# Patient Record
Sex: MALE | Race: WHITE | NOT HISPANIC OR LATINO | Employment: OTHER | URBAN - METROPOLITAN AREA
[De-identification: names, ages, dates, MRNs, and addresses within clinical notes are randomized per-mention and may not be internally consistent; named-entity substitution may affect disease eponyms.]

---

## 2021-07-29 ENCOUNTER — NEW PATIENT COMPREHENSIVE (OUTPATIENT)
Dept: URBAN - METROPOLITAN AREA CLINIC 36 | Facility: CLINIC | Age: 66
End: 2021-07-29

## 2021-07-29 DIAGNOSIS — H25.812: ICD-10-CM

## 2021-07-29 DIAGNOSIS — H52.7: ICD-10-CM

## 2021-07-29 DIAGNOSIS — H25.811: ICD-10-CM

## 2021-07-29 DIAGNOSIS — H18.513: ICD-10-CM

## 2021-07-29 PROCEDURE — 92015 DETERMINE REFRACTIVE STATE: CPT

## 2021-07-29 PROCEDURE — 92004 COMPRE OPH EXAM NEW PT 1/>: CPT

## 2021-07-29 ASSESSMENT — VISUAL ACUITY
OS_SC: >J10
OS_CC: J1+
OS_SC: 20/100
OS_CC: 20/25
OD_SC: 20/40
OD_CC: J1
OD_CC: 20/40-2
OD_SC: >J10

## 2021-07-29 ASSESSMENT — TONOMETRY
OD_IOP_MMHG: 17
OS_IOP_MMHG: 17

## 2021-10-21 NOTE — PROCEDURE NOTE: SURGICAL
<p>Prior to commencing surgery patient identification, surgical procedure, site, and side were confirmed by Dr. Diya Alexandra. Following topical proparacaine anesthesia, the patient was positioned at the YAG laser, a contact lens coupled to the cornea with methylcellulose and an axial posterior capsulotomy performed without complication using 3.2 Mj x 52. Excess methylcellulose was washed from the eye, one drop of Alphagan was instilled and the patient returned to the holding area having tolerated the procedure well and without complication. </p><p>MRN: [id number]</p><p>&nbsp;</p>

## 2023-10-31 ENCOUNTER — COMPREHENSIVE EXAM (OUTPATIENT)
Dept: URBAN - METROPOLITAN AREA CLINIC 36 | Facility: CLINIC | Age: 68
End: 2023-10-31

## 2023-10-31 DIAGNOSIS — H25.813: ICD-10-CM

## 2023-10-31 DIAGNOSIS — H52.7: ICD-10-CM

## 2023-10-31 DIAGNOSIS — H18.513: ICD-10-CM

## 2023-10-31 DIAGNOSIS — H35.30: ICD-10-CM

## 2023-10-31 PROCEDURE — 92015 DETERMINE REFRACTIVE STATE: CPT

## 2023-10-31 PROCEDURE — 92134 CPTRZ OPH DX IMG PST SGM RTA: CPT

## 2023-10-31 PROCEDURE — 92014 COMPRE OPH EXAM EST PT 1/>: CPT

## 2023-10-31 ASSESSMENT — VISUAL ACUITY
OD_PH: 20/40
OD_CC: 20/400
OD_CC: J6
OS_CC: J3
OD_SC: 20/100
OS_CC: 20/30-1
OS_SC: 20/80-2

## 2023-10-31 ASSESSMENT — TONOMETRY
OD_IOP_MMHG: 14
OS_IOP_MMHG: 14

## 2023-11-07 ENCOUNTER — CONSULTATION/EVALUATION (OUTPATIENT)
Dept: URBAN - METROPOLITAN AREA CLINIC 36 | Facility: CLINIC | Age: 68
End: 2023-11-07

## 2023-11-07 DIAGNOSIS — H35.30: ICD-10-CM

## 2023-11-07 DIAGNOSIS — H25.813: ICD-10-CM

## 2023-11-07 DIAGNOSIS — H18.513: ICD-10-CM

## 2023-11-07 DIAGNOSIS — H52.7: ICD-10-CM

## 2023-11-07 PROCEDURE — V2799PMN IMPRIMIS PRED-MOXI-NEPAF 5ML

## 2023-11-07 PROCEDURE — 99214 OFFICE O/P EST MOD 30 MIN: CPT

## 2023-11-07 PROCEDURE — 92025-1 CORNEAL TOPOGRAPHY, INS

## 2023-11-07 PROCEDURE — 92134 CPTRZ OPH DX IMG PST SGM RTA: CPT

## 2023-11-07 PROCEDURE — 92136 OPHTHALMIC BIOMETRY: CPT

## 2023-11-07 ASSESSMENT — VISUAL ACUITY
OS_CC: J1
OS_SC: 20/80-1
OS_SC: <J12
OD_CC: J6
OD_SC: 20/100
OS_CC: 20/30
OD_SC: J12
OD_CC: 20/400

## 2023-11-07 ASSESSMENT — TONOMETRY
OS_IOP_MMHG: 16
OD_IOP_MMHG: 16

## 2023-11-10 ENCOUNTER — TECH ONLY (OUTPATIENT)
Dept: URBAN - METROPOLITAN AREA CLINIC 39 | Facility: CLINIC | Age: 68
End: 2023-11-10

## 2023-11-10 ENCOUNTER — PRE-OP/H&P (OUTPATIENT)
Dept: URBAN - METROPOLITAN AREA SURGERY 14 | Facility: SURGERY | Age: 68
End: 2023-11-10

## 2023-11-10 ENCOUNTER — SURGERY/PROCEDURE (OUTPATIENT)
Dept: URBAN - METROPOLITAN AREA CLINIC 36 | Facility: CLINIC | Age: 68
End: 2023-11-10

## 2023-11-10 DIAGNOSIS — H35.30: ICD-10-CM

## 2023-11-10 DIAGNOSIS — H25.811: ICD-10-CM

## 2023-11-10 DIAGNOSIS — H25.813: ICD-10-CM

## 2023-11-10 DIAGNOSIS — H18.513: ICD-10-CM

## 2023-11-10 DIAGNOSIS — Z96.1: ICD-10-CM

## 2023-11-10 DIAGNOSIS — H52.7: ICD-10-CM

## 2023-11-10 PROCEDURE — 66999LNSR LENSAR LASER FOR CAT SX

## 2023-11-10 PROCEDURE — 99211T TECH SERVICE

## 2023-11-10 PROCEDURE — 99211HP H&P OFFICE/OUTPATIENT VISIT, EST

## 2023-11-10 PROCEDURE — 66982AV COMPLEX CATARACT WITH ADVANCED IOL

## 2023-11-10 ASSESSMENT — VISUAL ACUITY
OD_PH: 20/50
OD_SC: <J12
OD_SC: 20/60

## 2023-11-10 ASSESSMENT — TONOMETRY: OD_IOP_MMHG: 23

## 2023-11-14 ENCOUNTER — POST OP/EVAL OF SECOND EYE (OUTPATIENT)
Dept: URBAN - METROPOLITAN AREA CLINIC 36 | Facility: CLINIC | Age: 68
End: 2023-11-14

## 2023-11-14 DIAGNOSIS — Z96.1: ICD-10-CM

## 2023-11-14 DIAGNOSIS — H35.30: ICD-10-CM

## 2023-11-14 DIAGNOSIS — H18.513: ICD-10-CM

## 2023-11-14 DIAGNOSIS — H25.812: ICD-10-CM

## 2023-11-14 PROCEDURE — 92012 INTRM OPH EXAM EST PATIENT: CPT

## 2023-11-14 PROCEDURE — 99024 POSTOP FOLLOW-UP VISIT: CPT

## 2023-11-14 ASSESSMENT — VISUAL ACUITY
OS_SC: >J12
OD_SC: 20/50
OD_SC: J3
OS_SC: 20/40+2

## 2023-11-14 ASSESSMENT — TONOMETRY
OD_IOP_MMHG: 14
OS_IOP_MMHG: 14

## 2023-11-17 ENCOUNTER — SURGERY/PROCEDURE (OUTPATIENT)
Dept: URBAN - METROPOLITAN AREA CLINIC 36 | Facility: CLINIC | Age: 68
End: 2023-11-17

## 2023-11-17 ENCOUNTER — PRE-OP/H&P (OUTPATIENT)
Dept: URBAN - METROPOLITAN AREA SURGERY 14 | Facility: SURGERY | Age: 68
End: 2023-11-17

## 2023-11-17 ENCOUNTER — TECH ONLY (OUTPATIENT)
Dept: URBAN - METROPOLITAN AREA CLINIC 39 | Facility: CLINIC | Age: 68
End: 2023-11-17

## 2023-11-17 DIAGNOSIS — Z96.1: ICD-10-CM

## 2023-11-17 DIAGNOSIS — H25.813: ICD-10-CM

## 2023-11-17 DIAGNOSIS — H52.7: ICD-10-CM

## 2023-11-17 DIAGNOSIS — H35.30: ICD-10-CM

## 2023-11-17 DIAGNOSIS — H18.513: ICD-10-CM

## 2023-11-17 PROCEDURE — 66999LNSR LENSAR LASER FOR CAT SX

## 2023-11-17 PROCEDURE — 66984AV REMOVE CATARACT, INSERT ADVANCED LENS

## 2023-11-17 PROCEDURE — 99211HP H&P OFFICE/OUTPATIENT VISIT, EST

## 2023-11-17 PROCEDURE — 99211T TECH SERVICE

## 2023-11-17 PROCEDURE — 99199PAV PROF ADVANCED VISION PACKAGE

## 2023-11-17 ASSESSMENT — VISUAL ACUITY
OS_PH: 20/40+2
OS_SC: 20/50

## 2023-11-17 ASSESSMENT — TONOMETRY: OS_IOP_MMHG: 10

## 2023-11-21 ENCOUNTER — POST-OP (OUTPATIENT)
Dept: URBAN - METROPOLITAN AREA CLINIC 47 | Facility: CLINIC | Age: 68
End: 2023-11-21

## 2023-11-21 DIAGNOSIS — Z96.1: ICD-10-CM

## 2023-11-21 ASSESSMENT — TONOMETRY
OS_IOP_MMHG: 15
OD_IOP_MMHG: 15

## 2023-11-21 ASSESSMENT — VISUAL ACUITY
OS_SC: 20/40
OU_SC: 20/30
OD_SC: 20/30-1

## 2023-12-13 ENCOUNTER — POST-OP (OUTPATIENT)
Dept: URBAN - METROPOLITAN AREA CLINIC 36 | Facility: CLINIC | Age: 68
End: 2023-12-13

## 2023-12-13 DIAGNOSIS — Z96.1: ICD-10-CM

## 2023-12-13 PROCEDURE — 99024 POSTOP FOLLOW-UP VISIT: CPT

## 2023-12-13 ASSESSMENT — TONOMETRY
OS_IOP_MMHG: 15
OD_IOP_MMHG: 16

## 2023-12-13 ASSESSMENT — VISUAL ACUITY
OU_SC: J1
OS_SC: 20/30-1
OD_SC: J1
OU_SC: 20/20
OD_SC: 20/20-1
OS_SC: J12

## 2024-06-13 ENCOUNTER — COMPREHENSIVE EXAM (OUTPATIENT)
Dept: URBAN - METROPOLITAN AREA CLINIC 36 | Facility: CLINIC | Age: 69
End: 2024-06-13

## 2024-06-13 DIAGNOSIS — H52.7: ICD-10-CM

## 2024-06-13 DIAGNOSIS — Z96.1: ICD-10-CM

## 2024-06-13 DIAGNOSIS — H35.30: ICD-10-CM

## 2024-06-13 DIAGNOSIS — H18.513: ICD-10-CM

## 2024-06-13 PROCEDURE — 92015 DETERMINE REFRACTIVE STATE: CPT

## 2024-06-13 PROCEDURE — 92014 COMPRE OPH EXAM EST PT 1/>: CPT

## 2024-06-13 ASSESSMENT — VISUAL ACUITY
OS_SC: 20/30-1
OD_SC: 20/25-1
OD_SC: J2
OS_SC: J6

## 2024-06-13 ASSESSMENT — TONOMETRY
OD_IOP_MMHG: 13
OS_IOP_MMHG: 13

## 2024-07-11 ENCOUNTER — CONSULTATION/EVALUATION (OUTPATIENT)
Dept: URBAN - METROPOLITAN AREA CLINIC 36 | Facility: CLINIC | Age: 69
End: 2024-07-11

## 2024-07-11 DIAGNOSIS — D49.2: ICD-10-CM

## 2024-07-11 PROCEDURE — 99213 OFFICE O/P EST LOW 20 MIN: CPT

## 2024-07-11 PROCEDURE — 92285 EXTERNAL OCULAR PHOTOGRAPHY: CPT

## 2024-07-11 ASSESSMENT — VISUAL ACUITY
OD_SC: 20/25-1
OS_SC: 20/30-1

## 2024-08-22 ENCOUNTER — FOLLOW UP (OUTPATIENT)
Dept: URBAN - METROPOLITAN AREA CLINIC 36 | Facility: CLINIC | Age: 69
End: 2024-08-22

## 2024-08-22 DIAGNOSIS — D49.2: ICD-10-CM

## 2024-08-22 PROCEDURE — 92285 EXTERNAL OCULAR PHOTOGRAPHY: CPT

## 2024-08-22 PROCEDURE — 67810 INCAL BX EYELID SKN LID MRGN: CPT | Mod: E4

## 2024-08-22 RX ORDER — ERYTHROMYCIN 5 MG/G
OINTMENT OPHTHALMIC
End: 2024-09-05

## 2024-08-22 ASSESSMENT — VISUAL ACUITY
OD_SC: 20/25-1
OS_SC: 20/30

## 2024-08-29 ENCOUNTER — FOLLOW UP (OUTPATIENT)
Dept: URBAN - METROPOLITAN AREA CLINIC 36 | Facility: CLINIC | Age: 69
End: 2024-08-29

## 2024-08-29 DIAGNOSIS — H43.392: ICD-10-CM

## 2024-08-29 DIAGNOSIS — H43.822: ICD-10-CM

## 2024-08-29 PROCEDURE — 92012 INTRM OPH EXAM EST PATIENT: CPT

## 2024-08-29 PROCEDURE — 92134 CPTRZ OPH DX IMG PST SGM RTA: CPT

## 2024-08-29 ASSESSMENT — TONOMETRY
OS_IOP_MMHG: 12
OD_IOP_MMHG: 13

## 2024-08-29 ASSESSMENT — VISUAL ACUITY
OS_SC: 20/30-2
OD_SC: 20/25-2

## 2024-09-05 ENCOUNTER — FOLLOW UP (OUTPATIENT)
Dept: URBAN - METROPOLITAN AREA CLINIC 36 | Facility: CLINIC | Age: 69
End: 2024-09-05

## 2024-09-05 DIAGNOSIS — H43.812: ICD-10-CM

## 2024-09-05 DIAGNOSIS — H43.822: ICD-10-CM

## 2024-09-05 PROCEDURE — 92014 COMPRE OPH EXAM EST PT 1/>: CPT

## 2024-11-18 ENCOUNTER — SURGERY/PROCEDURE (OUTPATIENT)
Facility: LOCATION | Age: 69
End: 2024-11-18

## 2024-11-18 ENCOUNTER — PRE-OP/H&P (OUTPATIENT)
Dept: URBAN - METROPOLITAN AREA CLINIC 39 | Facility: CLINIC | Age: 69
End: 2024-11-18

## 2024-11-18 DIAGNOSIS — D48.1: ICD-10-CM

## 2024-11-18 DIAGNOSIS — C44.1222: ICD-10-CM

## 2024-11-18 PROCEDURE — 15004 WOUND PREP F/N/HF/G: CPT

## 2024-11-18 PROCEDURE — 12051 INTMD RPR FACE/MM 2.5 CM/<: CPT

## 2024-11-18 PROCEDURE — 99211T TECH SERVICE

## 2024-11-18 PROCEDURE — 15260 FTH/GFT FR N/E/E/L 20 SQCM/<: CPT

## 2024-12-02 ENCOUNTER — POST-OP (OUTPATIENT)
Age: 69
End: 2024-12-02

## 2024-12-02 DIAGNOSIS — Z98.890: ICD-10-CM

## 2024-12-02 PROCEDURE — 99024 POSTOP FOLLOW-UP VISIT: CPT

## 2024-12-02 PROCEDURE — 92285 EXTERNAL OCULAR PHOTOGRAPHY: CPT
